# Patient Record
Sex: MALE | Race: WHITE | NOT HISPANIC OR LATINO | Employment: FULL TIME | URBAN - METROPOLITAN AREA
[De-identification: names, ages, dates, MRNs, and addresses within clinical notes are randomized per-mention and may not be internally consistent; named-entity substitution may affect disease eponyms.]

---

## 2019-04-12 ENCOUNTER — OFFICE VISIT (OUTPATIENT)
Dept: URGENT CARE | Facility: CLINIC | Age: 59
End: 2019-04-12
Payer: COMMERCIAL

## 2019-04-12 VITALS
HEART RATE: 57 BPM | SYSTOLIC BLOOD PRESSURE: 130 MMHG | BODY MASS INDEX: 27.72 KG/M2 | DIASTOLIC BLOOD PRESSURE: 90 MMHG | WEIGHT: 198 LBS | TEMPERATURE: 98.6 F | HEIGHT: 71 IN | OXYGEN SATURATION: 97 %

## 2019-04-12 DIAGNOSIS — M10.9 ACUTE GOUT OF RIGHT FOOT, UNSPECIFIED CAUSE: ICD-10-CM

## 2019-04-12 PROCEDURE — 99203 OFFICE O/P NEW LOW 30 MIN: CPT | Performed by: PHYSICIAN ASSISTANT

## 2019-04-12 RX ORDER — INDOMETHACIN 50 MG/1
50 CAPSULE ORAL 3 TIMES DAILY
Qty: 90 CAP | Refills: 0 | Status: SHIPPED | OUTPATIENT
Start: 2019-04-12

## 2019-04-12 RX ORDER — PREDNISONE 20 MG/1
TABLET ORAL
Qty: 9 TAB | Refills: 0 | Status: SHIPPED | OUTPATIENT
Start: 2019-04-12

## 2019-04-12 ASSESSMENT — ENCOUNTER SYMPTOMS: FEVER: 0

## 2019-04-12 NOTE — PROGRESS NOTES
"Subjective:   Froylan Castro is a 58 y.o. male who presents for Foot Problem (right foot pain / poss. gout )    This is a new problem.  Patient presents to urgent care with 2-week history of pain, redness and swelling of the right great toe MTP region.  He reports initially that he had some tenderness over the second MTP which eventually progressed into the great toe MTP with redness and swelling.  Pain is worse with weightbearing and better with rest.  Patient has been taking over-the-counter anti-inflammatory medication with no significant improvement in symptoms.  Patient denies prior previous diagnosis of gout however he has had several episodes of similar presentation in years past.  Patient denies any fever.       Foot Problem   Pertinent negatives include no fever.     Review of Systems   Constitutional: Negative for fever.   Musculoskeletal: Positive for joint pain.   All other systems reviewed and are negative.    Not on File     Objective:   /90 (BP Location: Left arm, Patient Position: Sitting, BP Cuff Size: Adult)   Pulse (!) 57   Temp 37 °C (98.6 °F) (Temporal)   Ht 1.803 m (5' 11\")   Wt 89.8 kg (198 lb)   SpO2 97%   BMI 27.62 kg/m²   Physical Exam   Constitutional: He is oriented to person, place, and time. He appears well-developed and well-nourished.   HENT:   Head: Normocephalic and atraumatic.   Right Ear: External ear normal.   Left Ear: External ear normal.   Nose: Nose normal.   Mouth/Throat: Oropharynx is clear and moist.   Eyes: Pupils are equal, round, and reactive to light. Conjunctivae and EOM are normal.   Neck: Normal range of motion. Neck supple.   Cardiovascular: Normal rate, regular rhythm and normal heart sounds.    Pulmonary/Chest: Effort normal and breath sounds normal. No respiratory distress.   Abdominal: There is no tenderness.   Musculoskeletal: Normal range of motion.        Right foot: There is tenderness, bony tenderness and swelling. There is no deformity.   " Feet:    Lymphadenopathy:        Right: No inguinal adenopathy present.        Left: No inguinal adenopathy present.   Neurological: He is alert and oriented to person, place, and time.   Skin: Skin is warm and dry. No rash noted.   Psychiatric: He has a normal mood and affect. Judgment normal.          Assessment/Plan:   Assessment    1. Acute gout of right foot, unspecified cause  - predniSONE (DELTASONE) 20 MG Tab; Take 2 tabs daily x 3 days then 1 tab daily x 3 days  Dispense: 9 Tab; Refill: 0  - indomethacin (INDOCIN) 50 MG Cap; Take 1 Cap by mouth 3 times a day.  Dispense: 90 Cap; Refill: 0  - REFERRAL TO FOLLOW-UP WITH PRIMARY CARE    Physical exam findings and presentation are consistent with likely gout.  I did offer to obtain baseline labs for patient however he declines at this time.  Given the duration of his symptoms he will be treated with prednisone as above.  Following completion of prednisone if he is still experiencing pain he may take indomethacin.  Patient is counseled on not taking these 2 medications together.  He is also counseled on taking both medications with food in order to avoid GI upset.  Referral was placed to follow-up with primary care.      Differential diagnosis, natural history, supportive care, and indications for immediate follow-up discussed.      If not improving in 3-5 days, F/U with PCP or return to  or sooner if worsens    Red flag warning symptoms and strict ER/follow-up precautions given.    Please note that this note was created using voice recognition speech to text software. Every effort has been made to correct obvious errors.  However, I expect there are errors of grammar and possibly context that were not discovered prior to finalizing the note